# Patient Record
Sex: FEMALE | Race: WHITE | NOT HISPANIC OR LATINO
[De-identification: names, ages, dates, MRNs, and addresses within clinical notes are randomized per-mention and may not be internally consistent; named-entity substitution may affect disease eponyms.]

---
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
## 2023-01-11 ENCOUNTER — APPOINTMENT (OUTPATIENT)
Dept: PEDIATRICS | Facility: CLINIC | Age: 18
End: 2023-01-11
Payer: COMMERCIAL

## 2023-01-11 VITALS — TEMPERATURE: 97.8 F | WEIGHT: 156.5 LBS

## 2023-01-11 DIAGNOSIS — R09.81 NASAL CONGESTION: ICD-10-CM

## 2023-01-11 DIAGNOSIS — R05.9 COUGH, UNSPECIFIED: ICD-10-CM

## 2023-01-11 DIAGNOSIS — Z87.39 PERSONAL HISTORY OF OTHER DISEASES OF THE MUSCULOSKELETAL SYSTEM AND CONNECTIVE TISSUE: ICD-10-CM

## 2023-01-11 DIAGNOSIS — B27.80 OTHER INFECTIOUS MONONUCLEOSIS W/OUT COMPLICATION: ICD-10-CM

## 2023-01-11 PROCEDURE — 99213 OFFICE O/P EST LOW 20 MIN: CPT

## 2023-01-11 NOTE — DISCUSSION/SUMMARY
[FreeTextEntry1] : We discussed symptomatic treatment of cough and nasal sx, saline nose drops and humidifier, increased fluids and rest. Advil Sinus prn. Referral to ENT and Pulmonary. We discussed having a CXR if she is feeling worse before she leaves. I do not think restarting antibiotic for the third time is the right thing to do.\par

## 2023-01-11 NOTE — HISTORY OF PRESENT ILLNESS
[de-identified] : congestion and cough [FreeTextEntry6] : Mary is here with history of runny nose and congestion for "months", she was seen at college at an urgent care in November treated with Amox and Steroid without much relief, seen again (same urgent care)\par December repeated Amox and Steroid added cough med, COVID and FLU NEG, never fever (she did develop a yeast infection).\par She is leaving for college in 5 days and came to be checked again for persistent congestion and stuffiness, runny nose, no fever, intermittent headache, sleeping and eating normally. No relief with OTC. No PMHX asthma or allergy. Mom made an apt with ENT but wasn't able to get in before she leaves (made apt for March)\par She ALSO has bumps around her areola that she saw DERM for [place], culture taken, treated topically with antibiotic, better but occasionally gets red and drains yellow. Does not have a GYN yet.

## 2023-01-11 NOTE — REVIEW OF SYSTEMS
[Fever] : no fever [Headache] : headache [Nasal Discharge] : nasal discharge [Nasal Congestion] : nasal congestion [Cough] : cough

## 2023-01-11 NOTE — PHYSICAL EXAM
[Tenderness] : no tenderness [Mucoid Discharge] : mucoid discharge [Soft] : soft [Tender] : nontender [NL] : no abnormal lymph nodes palpated [FreeTextEntry1] : 97.8 [FreeTextEntry2] : no sinus tenderness

## 2023-03-06 ENCOUNTER — NON-APPOINTMENT (OUTPATIENT)
Age: 18
End: 2023-03-06

## 2023-03-06 ENCOUNTER — APPOINTMENT (OUTPATIENT)
Dept: OTOLARYNGOLOGY | Facility: CLINIC | Age: 18
End: 2023-03-06
Payer: COMMERCIAL

## 2023-03-06 VITALS
HEART RATE: 65 BPM | SYSTOLIC BLOOD PRESSURE: 107 MMHG | DIASTOLIC BLOOD PRESSURE: 64 MMHG | HEIGHT: 69 IN | TEMPERATURE: 98.2 F | BODY MASS INDEX: 22.22 KG/M2 | WEIGHT: 150 LBS

## 2023-03-06 PROCEDURE — 99204 OFFICE O/P NEW MOD 45 MIN: CPT | Mod: 25

## 2023-03-06 PROCEDURE — 31231 NASAL ENDOSCOPY DX: CPT

## 2023-03-06 NOTE — PROCEDURE
[FreeTextEntry6] : Procedure performed: Nasal Endoscopy- Diagnostic Pre-op indication(s): nasal congestion Post-op indication(s): nasal congestion  Verbal and/or written consent obtained from patient Anterior rhinoscopy insufficient to account for symptoms Scope #: 3,  flexible fiber optic telescope  The scope was introduced in the nasal passage between the middle and inferior turbinates to exam the inferior portion of the middle meatus and the fontanelle, as well as the maxillary ostia.  Next, the scope was passed medically and posteriorly to the middle turbinates to examine the sphenoethmoid recess and the superior turbinate region. Upon visualization the finders are as follows: \par Nasal Septum: R DNS\par Bilateral - Mucosa: boggy turbinates, Mucous: scant, Polyp: not seen, Inferior Turbinate: boggy, Middle Turbinate: normal, Superior Turbinate: normal, Inferior Meatus: narrow, Middle Meatus: narrow, Super Meatus:normal, Sphenoethmoidal Recess: clear\par diffuse swelling

## 2023-03-06 NOTE — PHYSICAL EXAM
[Nasal Endoscopy Performed] : nasal endoscopy was performed, see procedure section for findings [] : septum deviated to the right [Normal] : no abnormal secretions [de-identified] : neck tightness

## 2023-03-06 NOTE — REVIEW OF SYSTEMS
[Post Nasal Drip] : post nasal drip [As Noted in HPI] : as noted in HPI [Nasal Congestion] : nasal congestion [Sinus Pain] : sinus pain [Sinus Pressure] : sinus pressure [Negative] : Heme/Lymph

## 2023-03-06 NOTE — CONSULT LETTER
[Please see my note below.] : Please see my note below. [FreeTextEntry1] : Dear Dr. KELLY OVERTON  I had the pleasure of evaluating your patient THEO BAUM, thank you for allowing us to participate in their care. please see full note detailing our visit below. If you have any questions, please do not hesitate to call me and I would be happy to discuss further.   David Pagan M.D. Attending Physician,   Department of Otolaryngology - Head and Neck Surgery [facility]  Office: [phone] Fax: [phone]\par

## 2023-03-06 NOTE — HISTORY OF PRESENT ILLNESS
[de-identified] : 17 year old female presents with persistent congestion, ear pressure, headache and sinus pressure for at least 3 years. Sinus pressure is at frontal, under eyes, and sometimes at ears. Headache is diffuse. Stuffiness is worse on the right side. Patient had allergy testing in the past that indicated allergies to dust, dogs and cats but still feels poorly when at college away from animals. Has tried antibiotics on two occasions, flonase, nasal sprays, singulair which does not provide durable relief. Denies taking steroids recently. Denies hx of ear infections or sinus infections.

## 2023-03-06 NOTE — ASSESSMENT
[FreeTextEntry1] : 16 y/o F pt presents with chronic sinusitis. On exam right DNS, and diffuse swelling.\par -We will proceed with a regiment of decongestants, antibiotics and irrigation to try to break the cycle of inflation and obstruction. this will treat the acute symptoms and will hopefully allow for maintenance therapy to reach disease areas and avoid further intervention.\par - discussed starting Flonase and nasal washes \par - if condition does not improve, discussed potential for surgical or procedure intervention\par - can consider CT scan to further assess sinuses if medical tx does not provide relief \par \par Pt also reports ear discomfort. On exam pt presents with tight neck and overall benign ear exam\par - ear aches could be due to neck tightness. Acupuncture or massage therapy discussed to see if this helps with ear

## 2023-05-18 ENCOUNTER — APPOINTMENT (OUTPATIENT)
Dept: OTOLARYNGOLOGY | Facility: CLINIC | Age: 18
End: 2023-05-18
Payer: COMMERCIAL

## 2023-05-18 VITALS
HEART RATE: 65 BPM | BODY MASS INDEX: 22.22 KG/M2 | HEIGHT: 69 IN | TEMPERATURE: 98.3 F | DIASTOLIC BLOOD PRESSURE: 61 MMHG | SYSTOLIC BLOOD PRESSURE: 114 MMHG | WEIGHT: 150 LBS

## 2023-05-18 PROCEDURE — 99214 OFFICE O/P EST MOD 30 MIN: CPT | Mod: 25

## 2023-05-18 PROCEDURE — 31231 NASAL ENDOSCOPY DX: CPT

## 2023-05-18 NOTE — CONSULT LETTER
[Please see my note below.] : Please see my note below. [FreeTextEntry1] : Dear Dr. KELLY OVERTON  I had the pleasure of evaluating your patient THEO BAUM, thank you for allowing us to participate in their care. please see full note detailing our visit below. If you have any questions, please do not hesitate to call me and I would be happy to discuss further.   David Pagan M.D. Attending Physician,   Department of Otolaryngology - Head and Neck Surgery [facility]  Office: [phone] Fax: [phone]\par

## 2023-05-18 NOTE — END OF VISIT
[FreeTextEntry3] : I personally saw and examined the patient in detail. I spoke to LEEANNE Booth regarding the assessment and plan of care.  I preformed the procedures and I reviewed the above assessment and plan of care, and agree. I have made changes in changes in the body of the note where appropriate.

## 2023-05-18 NOTE — HISTORY OF PRESENT ILLNESS
[de-identified] : 17 year old female presents with persistent congestion, ear pressure, headache and sinus pressure for at least 3 years. Sinus pressure is at frontal, under eyes, and sometimes at ears. Headache is diffuse. Stuffiness is worse on the right side. Patient had allergy testing in the past that indicated allergies to dust, dogs and cats but still feels poorly when at college away from animals. Has tried antibiotics on two occasions, flonase, nasal sprays, singulair which does not provide durable relief. Denies taking steroids recently. Denies hx of ear infections or sinus infections.   [FreeTextEntry1] : Patient following up with persistent congestion and sinus pressure at forehead and under eyes for at least 3 years. States while taking sinusitis regiment, was less stuffy but stuffiness came back after completion. Denies sinus infections. Tried flonase which felt like it wasn’t getting where it needed to go, so stopped doing them. \par \par States ear discomfort resolved since last visit.

## 2023-05-18 NOTE — PROCEDURE
[FreeTextEntry6] : Procedure performed: Nasal Endoscopy- Diagnostic Pre-op indication(s): nasal congestion Post-op indication(s): nasal congestion  Verbal and/or written consent obtained from patient Anterior rhinoscopy insufficient to account for symptoms Scope #: 3,  flexible fiber optic telescope  The scope was introduced in the nasal passage between the middle and inferior turbinates to exam the inferior portion of the middle meatus and the fontanelle, as well as the maxillary ostia.  Next, the scope was passed medically and posteriorly to the middle turbinates to examine the sphenoethmoid recess and the superior turbinate region. Upon visualization the finders are as follows: \par Nasal Septum: R DNS, septum comes caudally to left\par Bilateral - Mucosa: boggy turbinates, Mucous: scant, Polyp: not seen, Inferior Turbinate: boggy, Middle Turbinate: normal, Superior Turbinate: normal, Inferior Meatus: narrow, Middle Meatus: narrow, Super Meatus:normal, Sphenoethmoidal Recess: clear\par diffuse swelling R>L

## 2023-05-18 NOTE — PHYSICAL EXAM
[Nasal Endoscopy Performed] : nasal endoscopy was performed, see procedure section for findings [] : septum deviated to the right [Normal] : no abnormal secretions [de-identified] : + jorge

## 2023-05-18 NOTE — ASSESSMENT
[FreeTextEntry1] : 16 y/o F pt presents with chronic sinusitis. On exam right DNS, septum comes caudally to left and diffuse swelling R>L. Antibiotics and steroids did not provide durable relief. \par - discussed starting Flonase and nasal washes \par - discussed ordering CT scan to further evaluate sinuses. \par - discussed office vs sleeping sinus procedure depending on CT scan results. \par -Risks benefits and alternatives of endoscopic sinus surgery with possible image guidance possible septoplasty bilateral inferior turbinate reduction discussed with patient at length. Risks discussed include but were not limited to bleeding, infection, persistent symptoms, scarring, injury to the skull base and brain and CSF leak, injury to orbit, crusting, septal hematoma, septal perforation results in whistling, crusting and bleeding as well as continued nasal obstruction etc. were discussed. also discussed option of office balloon - similar risk profile, will not address septum and turbs and is less invasive with quicker recover however also higher possibility for need for future intervention. They would like to proceed with balloon.\par -Risks benefits and alternatives of endoscopic sinus surgery with possible image guidance possible septoplasty bilateral inferior turbinate reduction discussed with patient at length. Risks discussed include but were not limited to bleeding, infection, persistent symptoms, scarring, injury to the skull base and brain and CSF leak, injury to orbit, crusting, septal hematoma, septal perforation results in whistling, crusting and bleeding as well as continued nasal obstruction etc. were discussed. Patient verbalized understanding of risks and benefits and also asked probing follow up questions which were answered to clarify details and get full understanding.\par - will determine whether to do sleeping vs office sinus procedure based on CT scan results. Possible right  if do sleeping. \par \par \par

## 2023-05-31 ENCOUNTER — APPOINTMENT (OUTPATIENT)
Dept: CT IMAGING | Facility: CLINIC | Age: 18
End: 2023-05-31
Payer: COMMERCIAL

## 2023-05-31 PROCEDURE — 70486 CT MAXILLOFACIAL W/O DYE: CPT

## 2023-06-05 ENCOUNTER — NON-APPOINTMENT (OUTPATIENT)
Age: 18
End: 2023-06-05

## 2023-06-29 ENCOUNTER — APPOINTMENT (OUTPATIENT)
Dept: OTOLARYNGOLOGY | Facility: CLINIC | Age: 18
End: 2023-06-29
Payer: COMMERCIAL

## 2023-06-29 VITALS
DIASTOLIC BLOOD PRESSURE: 58 MMHG | HEART RATE: 67 BPM | WEIGHT: 150 LBS | TEMPERATURE: 97.9 F | BODY MASS INDEX: 22.22 KG/M2 | HEIGHT: 69 IN | SYSTOLIC BLOOD PRESSURE: 108 MMHG

## 2023-06-29 PROCEDURE — 99214 OFFICE O/P EST MOD 30 MIN: CPT | Mod: 25

## 2023-06-29 PROCEDURE — 31231 NASAL ENDOSCOPY DX: CPT

## 2023-06-29 NOTE — HISTORY OF PRESENT ILLNESS
[de-identified] : 17 year old female presents with persistent congestion, ear pressure, headache and sinus pressure for at least 3 years. Sinus pressure is at frontal, under eyes, and sometimes at ears. Headache is diffuse. Stuffiness is worse on the right side. Patient had allergy testing in the past that indicated allergies to dust, dogs and cats but still feels poorly when at college away from animals. Has tried antibiotics on two occasions, flonase, nasal sprays, singulair which does not provide durable relief. Denies taking steroids recently. Denies hx of ear infections or sinus infections.  \par \par Patient following up with persistent congestion and sinus pressure at forehead and under eyes for at least 3 years. States while taking sinusitis regiment, was less stuffy but stuffiness came back after completion. Denies sinus infections. Tried flonase which felt like it wasn’t getting where it needed to go, so stopped doing them. \par \par States ear discomfort resolved since last visit.  [FreeTextEntry1] : Patient following up still with nasal congestion. Here to review CT scan and come up with plan. Sinusitis regiment helped temporarily but symptoms came back. Denies using nasal sprays or saline washes.

## 2023-06-29 NOTE — CONSULT LETTER
[Please see my note below.] : Please see my note below. [FreeTextEntry1] : Dear Dr. KELLY OVERTON  I had the pleasure of evaluating your patient THEO BAUM, thank you for allowing us to participate in their care. please see full note detailing our visit below. If you have any questions, please do not hesitate to call me and I would be happy to discuss further.   David Pagan M.D. Attending Physician,   Department of Otolaryngology - Head and Neck Surgery [facility]  Office: [phone] Fax: [phone]\par

## 2023-06-29 NOTE — PHYSICAL EXAM
[Nasal Endoscopy Performed] : nasal endoscopy was performed, see procedure section for findings [] : septum deviated to the right [Normal] : no abnormal secretions [de-identified] : + jorge

## 2023-06-29 NOTE — ASSESSMENT
[FreeTextEntry1] : 18 y/o F pt presents with chronic sinusitis. On exam right DNS, septum comes caudally to left and diffuse swelling R>L. Antibiotics and steroids did not provide durable relief. \par - discussed starting Flonase and nasal washes \par - CT scan indicated: Mucosal thickening and aerosolized secretions within the posterior aspect of a single sphenoid sinus. Mucosal thickening obstructing the ostium of the right ostiomeatal unit. Nasal septal deviation to the right markedly narrowing the right nasal cavity\par -Risks benefits and alternatives of endoscopic sinus surgery with possible image guidance possible septoplasty bilateral inferior turbinate reduction discussed with patient at length. Risks discussed include but were not limited to bleeding, infection, persistent symptoms, scarring, injury to the skull base and brain and CSF leak, injury to orbit, crusting, septal hematoma, septal perforation results in whistling, crusting and bleeding as well as continued nasal obstruction etc. were discussed. also discussed option of office balloon - similar risk profile, will not address septum and turbs and is less invasive with quicker recover however also higher possibility for need for future intervention. \par -Risks benefits and alternatives of endoscopic sinus surgery with possible image guidance possible septoplasty bilateral inferior turbinate reduction discussed with patient at length. Risks discussed include but were not limited to bleeding, infection, persistent symptoms, scarring, injury to the skull base and brain and CSF leak, injury to orbit, crusting, septal hematoma, septal perforation results in whistling, crusting and bleeding as well as continued nasal obstruction etc. were discussed. Patient verbalized understanding of risks and benefits and also asked probing follow up questions which were answered to clarify details and get full understanding.\par - discussed cosmetic concerns and advised patient subtle changes are difficult and may not meet her goals. Can do right  to help fill in that side and create symmetry. \par - will determine whether to do sleeping vs office sinus procedure based on CT scan results. Right  if do sleeping. will book patient for septum, turbinates, fess possible frontals as most of her symptoms are at forehead but scan just with [facility] obstruction, discussed risk of frontal sinusotomy and risk of persistent forehead pressure . \par - photos today

## 2023-11-06 ENCOUNTER — OUTPATIENT (OUTPATIENT)
Dept: OUTPATIENT SERVICES | Facility: HOSPITAL | Age: 18
LOS: 1 days | End: 2023-11-06
Payer: COMMERCIAL

## 2023-11-06 VITALS
DIASTOLIC BLOOD PRESSURE: 64 MMHG | SYSTOLIC BLOOD PRESSURE: 112 MMHG | WEIGHT: 158.95 LBS | TEMPERATURE: 98 F | OXYGEN SATURATION: 98 % | HEART RATE: 56 BPM | RESPIRATION RATE: 16 BRPM | HEIGHT: 69 IN

## 2023-11-06 DIAGNOSIS — J32.4 CHRONIC PANSINUSITIS: ICD-10-CM

## 2023-11-06 DIAGNOSIS — Z98.890 OTHER SPECIFIED POSTPROCEDURAL STATES: Chronic | ICD-10-CM

## 2023-11-06 DIAGNOSIS — J34.3 HYPERTROPHY OF NASAL TURBINATES: ICD-10-CM

## 2023-11-06 DIAGNOSIS — J34.2 DEVIATED NASAL SEPTUM: ICD-10-CM

## 2023-11-06 DIAGNOSIS — Z01.818 ENCOUNTER FOR OTHER PREPROCEDURAL EXAMINATION: ICD-10-CM

## 2023-11-06 LAB
ANION GAP SERPL CALC-SCNC: 12 MMOL/L — SIGNIFICANT CHANGE UP (ref 5–17)
ANION GAP SERPL CALC-SCNC: 12 MMOL/L — SIGNIFICANT CHANGE UP (ref 5–17)
BUN SERPL-MCNC: 14 MG/DL — SIGNIFICANT CHANGE UP (ref 7–23)
BUN SERPL-MCNC: 14 MG/DL — SIGNIFICANT CHANGE UP (ref 7–23)
CALCIUM SERPL-MCNC: 9.2 MG/DL — SIGNIFICANT CHANGE UP (ref 8.4–10.5)
CALCIUM SERPL-MCNC: 9.2 MG/DL — SIGNIFICANT CHANGE UP (ref 8.4–10.5)
CHLORIDE SERPL-SCNC: 103 MMOL/L — SIGNIFICANT CHANGE UP (ref 96–108)
CHLORIDE SERPL-SCNC: 103 MMOL/L — SIGNIFICANT CHANGE UP (ref 96–108)
CO2 SERPL-SCNC: 22 MMOL/L — SIGNIFICANT CHANGE UP (ref 22–31)
CO2 SERPL-SCNC: 22 MMOL/L — SIGNIFICANT CHANGE UP (ref 22–31)
CREAT SERPL-MCNC: 0.68 MG/DL — SIGNIFICANT CHANGE UP (ref 0.5–1.3)
CREAT SERPL-MCNC: 0.68 MG/DL — SIGNIFICANT CHANGE UP (ref 0.5–1.3)
EGFR: 128 ML/MIN/1.73M2 — SIGNIFICANT CHANGE UP
EGFR: 128 ML/MIN/1.73M2 — SIGNIFICANT CHANGE UP
GLUCOSE SERPL-MCNC: 81 MG/DL — SIGNIFICANT CHANGE UP (ref 70–99)
GLUCOSE SERPL-MCNC: 81 MG/DL — SIGNIFICANT CHANGE UP (ref 70–99)
HCT VFR BLD CALC: 38.4 % — SIGNIFICANT CHANGE UP (ref 34.5–45)
HCT VFR BLD CALC: 38.4 % — SIGNIFICANT CHANGE UP (ref 34.5–45)
HGB BLD-MCNC: 12.6 G/DL — SIGNIFICANT CHANGE UP (ref 11.5–15.5)
HGB BLD-MCNC: 12.6 G/DL — SIGNIFICANT CHANGE UP (ref 11.5–15.5)
MCHC RBC-ENTMCNC: 29.7 PG — SIGNIFICANT CHANGE UP (ref 27–34)
MCHC RBC-ENTMCNC: 29.7 PG — SIGNIFICANT CHANGE UP (ref 27–34)
MCHC RBC-ENTMCNC: 32.8 GM/DL — SIGNIFICANT CHANGE UP (ref 32–36)
MCHC RBC-ENTMCNC: 32.8 GM/DL — SIGNIFICANT CHANGE UP (ref 32–36)
MCV RBC AUTO: 90.6 FL — SIGNIFICANT CHANGE UP (ref 80–100)
MCV RBC AUTO: 90.6 FL — SIGNIFICANT CHANGE UP (ref 80–100)
NRBC # BLD: 0 /100 WBCS — SIGNIFICANT CHANGE UP (ref 0–0)
NRBC # BLD: 0 /100 WBCS — SIGNIFICANT CHANGE UP (ref 0–0)
PLATELET # BLD AUTO: 361 K/UL — SIGNIFICANT CHANGE UP (ref 150–400)
PLATELET # BLD AUTO: 361 K/UL — SIGNIFICANT CHANGE UP (ref 150–400)
POTASSIUM SERPL-MCNC: 4 MMOL/L — SIGNIFICANT CHANGE UP (ref 3.5–5.3)
POTASSIUM SERPL-MCNC: 4 MMOL/L — SIGNIFICANT CHANGE UP (ref 3.5–5.3)
POTASSIUM SERPL-SCNC: 4 MMOL/L — SIGNIFICANT CHANGE UP (ref 3.5–5.3)
POTASSIUM SERPL-SCNC: 4 MMOL/L — SIGNIFICANT CHANGE UP (ref 3.5–5.3)
RBC # BLD: 4.24 M/UL — SIGNIFICANT CHANGE UP (ref 3.8–5.2)
RBC # BLD: 4.24 M/UL — SIGNIFICANT CHANGE UP (ref 3.8–5.2)
RBC # FLD: 14.2 % — SIGNIFICANT CHANGE UP (ref 10.3–14.5)
RBC # FLD: 14.2 % — SIGNIFICANT CHANGE UP (ref 10.3–14.5)
SODIUM SERPL-SCNC: 137 MMOL/L — SIGNIFICANT CHANGE UP (ref 135–145)
SODIUM SERPL-SCNC: 137 MMOL/L — SIGNIFICANT CHANGE UP (ref 135–145)
WBC # BLD: 4.94 K/UL — SIGNIFICANT CHANGE UP (ref 3.8–10.5)
WBC # BLD: 4.94 K/UL — SIGNIFICANT CHANGE UP (ref 3.8–10.5)
WBC # FLD AUTO: 4.94 K/UL — SIGNIFICANT CHANGE UP (ref 3.8–10.5)
WBC # FLD AUTO: 4.94 K/UL — SIGNIFICANT CHANGE UP (ref 3.8–10.5)

## 2023-11-06 PROCEDURE — 80048 BASIC METABOLIC PNL TOTAL CA: CPT

## 2023-11-06 PROCEDURE — 85027 COMPLETE CBC AUTOMATED: CPT

## 2023-11-06 PROCEDURE — G0463: CPT

## 2023-11-06 RX ORDER — SODIUM CHLORIDE 9 MG/ML
3 INJECTION INTRAMUSCULAR; INTRAVENOUS; SUBCUTANEOUS EVERY 8 HOURS
Refills: 0 | Status: DISCONTINUED | OUTPATIENT
Start: 2023-11-22 | End: 2023-12-06

## 2023-11-06 RX ORDER — LIDOCAINE HCL 20 MG/ML
0.2 VIAL (ML) INJECTION ONCE
Refills: 0 | Status: DISCONTINUED | OUTPATIENT
Start: 2023-11-22 | End: 2023-12-06

## 2023-11-06 RX ORDER — SODIUM CHLORIDE 9 MG/ML
1000 INJECTION, SOLUTION INTRAVENOUS
Refills: 0 | Status: DISCONTINUED | OUTPATIENT
Start: 2023-11-22 | End: 2023-12-06

## 2023-11-06 NOTE — H&P PST ADULT - PROBLEM SELECTOR PLAN 1
Septoplasty Jordi Inferior Turbinoplasty Functional Endoscopic Sinus Surgery with [facility] on 11/22/23  Pre-op education provided - all questions answered. Pt verbalized understanding

## 2023-11-06 NOTE — H&P PST ADULT - NSICDXPASTMEDICALHX_GEN_ALL_CORE_FT
PAST MEDICAL HISTORY:  2019 novel coronavirus disease (COVID-19)     Asthma with hay fever     Chronic pansinusitis     Deviated nasal septum     H/O hemorrhoids     Hypertrophy of both inferior nasal turbinates     Mild asthma without complication

## 2023-11-06 NOTE — H&P PST ADULT - HISTORY OF PRESENT ILLNESS
19 y/o F with h/o Asthma (mild), presents with persistent congestion, ear pressure, headache and sinus pressure for at least 3 years. Pt states she has tried Flonase nasal sprays and singular which does not provide durable relief. Today she presents to PST for scheduled Septoplasty Jordi Inferior Turbinoplasty Functional Endoscopic Sinus Surgery with [facility] on 11/22/23. Denies any palpitations, SOB, N/V, fever or chills.

## 2023-11-08 PROBLEM — U07.1 COVID-19: Chronic | Status: ACTIVE | Noted: 2023-11-06

## 2023-11-08 PROBLEM — J45.909 UNSPECIFIED ASTHMA, UNCOMPLICATED: Chronic | Status: ACTIVE | Noted: 2023-11-06

## 2023-11-08 PROBLEM — J34.2 DEVIATED NASAL SEPTUM: Chronic | Status: ACTIVE | Noted: 2023-11-06

## 2023-11-08 PROBLEM — J32.4 CHRONIC PANSINUSITIS: Chronic | Status: ACTIVE | Noted: 2023-11-06

## 2023-11-08 PROBLEM — J34.3 HYPERTROPHY OF NASAL TURBINATES: Chronic | Status: ACTIVE | Noted: 2023-11-06

## 2023-11-08 PROBLEM — Z87.19 PERSONAL HISTORY OF OTHER DISEASES OF THE DIGESTIVE SYSTEM: Chronic | Status: ACTIVE | Noted: 2023-11-06

## 2023-11-14 ENCOUNTER — NON-APPOINTMENT (OUTPATIENT)
Age: 18
End: 2023-11-14

## 2023-11-16 ENCOUNTER — APPOINTMENT (OUTPATIENT)
Dept: OTOLARYNGOLOGY | Facility: CLINIC | Age: 18
End: 2023-11-16
Payer: COMMERCIAL

## 2023-11-16 VITALS
WEIGHT: 150 LBS | HEART RATE: 62 BPM | DIASTOLIC BLOOD PRESSURE: 58 MMHG | SYSTOLIC BLOOD PRESSURE: 120 MMHG | TEMPERATURE: 97.88 F | HEIGHT: 69 IN | BODY MASS INDEX: 22.22 KG/M2

## 2023-11-16 PROCEDURE — 99214 OFFICE O/P EST MOD 30 MIN: CPT | Mod: 25

## 2023-11-16 PROCEDURE — 31231 NASAL ENDOSCOPY DX: CPT

## 2023-11-21 ENCOUNTER — TRANSCRIPTION ENCOUNTER (OUTPATIENT)
Age: 18
End: 2023-11-21

## 2023-11-22 ENCOUNTER — OUTPATIENT (OUTPATIENT)
Dept: OUTPATIENT SERVICES | Facility: HOSPITAL | Age: 18
LOS: 1 days | End: 2023-11-22
Payer: COMMERCIAL

## 2023-11-22 ENCOUNTER — TRANSCRIPTION ENCOUNTER (OUTPATIENT)
Age: 18
End: 2023-11-22

## 2023-11-22 ENCOUNTER — APPOINTMENT (OUTPATIENT)
Dept: OTOLARYNGOLOGY | Facility: HOSPITAL | Age: 18
End: 2023-11-22

## 2023-11-22 ENCOUNTER — RESULT REVIEW (OUTPATIENT)
Age: 18
End: 2023-11-22

## 2023-11-22 VITALS
WEIGHT: 158.95 LBS | HEART RATE: 72 BPM | OXYGEN SATURATION: 99 % | HEIGHT: 69 IN | DIASTOLIC BLOOD PRESSURE: 75 MMHG | RESPIRATION RATE: 16 BRPM | SYSTOLIC BLOOD PRESSURE: 120 MMHG | TEMPERATURE: 97 F

## 2023-11-22 VITALS
DIASTOLIC BLOOD PRESSURE: 63 MMHG | RESPIRATION RATE: 16 BRPM | OXYGEN SATURATION: 98 % | SYSTOLIC BLOOD PRESSURE: 120 MMHG | HEART RATE: 75 BPM

## 2023-11-22 DIAGNOSIS — J34.2 DEVIATED NASAL SEPTUM: ICD-10-CM

## 2023-11-22 DIAGNOSIS — J34.3 HYPERTROPHY OF NASAL TURBINATES: ICD-10-CM

## 2023-11-22 DIAGNOSIS — J32.4 CHRONIC PANSINUSITIS: ICD-10-CM

## 2023-11-22 DIAGNOSIS — Z98.890 OTHER SPECIFIED POSTPROCEDURAL STATES: Chronic | ICD-10-CM

## 2023-11-22 DIAGNOSIS — Z01.818 ENCOUNTER FOR OTHER PREPROCEDURAL EXAMINATION: ICD-10-CM

## 2023-11-22 PROCEDURE — 88311 DECALCIFY TISSUE: CPT | Mod: 26

## 2023-11-22 PROCEDURE — 30930 THER FX NASAL INF TURBINATE: CPT

## 2023-11-22 PROCEDURE — 61782 SCAN PROC CRANIAL EXTRA: CPT

## 2023-11-22 PROCEDURE — 30465 REPAIR NASAL STENOSIS: CPT

## 2023-11-22 PROCEDURE — 31240 NSL/SNS NDSC CNCH BULL RESCJ: CPT | Mod: LT

## 2023-11-22 PROCEDURE — 31276 NSL/SINS NDSC FRNT TISS RMVL: CPT | Mod: 50

## 2023-11-22 PROCEDURE — 88311 DECALCIFY TISSUE: CPT

## 2023-11-22 PROCEDURE — 31288 NASAL/SINUS ENDOSCOPY SURG: CPT | Mod: LT

## 2023-11-22 PROCEDURE — 31288 NASAL/SINUS ENDOSCOPY SURG: CPT | Mod: 50

## 2023-11-22 PROCEDURE — 30520 REPAIR OF NASAL SEPTUM: CPT

## 2023-11-22 PROCEDURE — 30140 RESECT INFERIOR TURBINATE: CPT | Mod: 50

## 2023-11-22 PROCEDURE — 88304 TISSUE EXAM BY PATHOLOGIST: CPT

## 2023-11-22 PROCEDURE — 20912 REMOVE CARTILAGE FOR GRAFT: CPT | Mod: 59

## 2023-11-22 PROCEDURE — 88304 TISSUE EXAM BY PATHOLOGIST: CPT | Mod: 26

## 2023-11-22 PROCEDURE — 88305 TISSUE EXAM BY PATHOLOGIST: CPT | Mod: 26

## 2023-11-22 PROCEDURE — 31267 ENDOSCOPY MAXILLARY SINUS: CPT | Mod: 50

## 2023-11-22 PROCEDURE — 88305 TISSUE EXAM BY PATHOLOGIST: CPT

## 2023-11-22 PROCEDURE — C9399: CPT

## 2023-11-22 RX ORDER — DIPHENHYDRAMINE HCL 50 MG
12.5 CAPSULE ORAL ONCE
Refills: 0 | Status: DISCONTINUED | OUTPATIENT
Start: 2023-11-22 | End: 2023-12-06

## 2023-11-22 RX ORDER — HYDROMORPHONE HYDROCHLORIDE 2 MG/ML
0.25 INJECTION INTRAMUSCULAR; INTRAVENOUS; SUBCUTANEOUS
Refills: 0 | Status: DISCONTINUED | OUTPATIENT
Start: 2023-11-22 | End: 2023-11-22

## 2023-11-22 RX ORDER — OXYCODONE HYDROCHLORIDE 5 MG/1
5 TABLET ORAL ONCE
Refills: 0 | Status: DISCONTINUED | OUTPATIENT
Start: 2023-11-22 | End: 2023-11-22

## 2023-11-22 RX ORDER — APREPITANT 80 MG/1
40 CAPSULE ORAL ONCE
Refills: 0 | Status: COMPLETED | OUTPATIENT
Start: 2023-11-22 | End: 2023-11-22

## 2023-11-22 RX ORDER — ONDANSETRON 8 MG/1
4 TABLET, FILM COATED ORAL ONCE
Refills: 0 | Status: DISCONTINUED | OUTPATIENT
Start: 2023-11-22 | End: 2023-12-06

## 2023-11-22 RX ORDER — ALBUTEROL 90 UG/1
2 AEROSOL, METERED ORAL
Refills: 0 | DISCHARGE

## 2023-11-22 RX ADMIN — SODIUM CHLORIDE 100 MILLILITER(S): 9 INJECTION, SOLUTION INTRAVENOUS at 07:39

## 2023-11-22 RX ADMIN — APREPITANT 40 MILLIGRAM(S): 80 CAPSULE ORAL at 08:56

## 2023-11-22 NOTE — ASU DISCHARGE PLAN (ADULT/PEDIATRIC) - NS MD DC FALL RISK RISK
For information on Fall & Injury Prevention, visit: [email or web address] OR  [email or web address] OR  https://www.cdc.gov/steadi/patient.html

## 2023-11-22 NOTE — ASU DISCHARGE PLAN (ADULT/PEDIATRIC) - ASU DC SPECIAL INSTRUCTIONSFT
Nasal reconstruction instructions:  Complete antibiotics and steroids as directed on prescription  Pain medication as needed - do not drive, make decisions or operate machinery on pain meds. if constipates take stool softener, do not strain.     Avoid activities that may injure your nose: Right after your surgery, your nose is just starting to heal and can be damaged in many ways. Doing the following will help prevent your nose from getting injured:  •Do not allow anyone to accidently bump your nose. This includes children, pets, and your bed partner.  •Do not blow your nose for at least two weeks after surgery  •Do not do any strenuous activities. Do not go swimming for one month after your surgery.  •Do not wear clothing that you have to pull on over your head.  • Please walk around to keep blood circulating and reduce the risk of blood clots   Bathing and washing:   •Avoid touching or getting your nose dressing wet when washing your face.  •Be gentle with brushing your teeth and use a soft toothbrush.  •Have someone else wash your hair during the first week after your surgery.  •Take tub baths only and do not shower. Keep your dressing dry when bathing.    Limit your facial movements as much as possible:   •Avoid facial movements for one week. This includes grinning, laughing, and smiling.  •Avoid eating foods that need to be chewed for a long time.  •Avoid talking for a long time.    Wearing your contact lens or glasses:   •You can wear your contact lenses  •Do not wear your glasses or sunglasses where they rest on the top of your nose. A if needed you can use some tape to hang glasses off your forehead so the do not rest on your nose      •Nasal drainage: You may be sent home with a dressing under your nose. Change it when it gets wet and avoid touching your nose when doing so.  •Nasal rinsing:  Irrigate your nose cavity with saline (salt solution) 3 to 5 times each day. This should be done for at least two months after your surgery.   •Plaster cast and splint care: You may have a plaster cast or a splint on the outside of your nose. If so, it will be left on your nose for one week, will be removed in the office.  •Do not touch or disturb the cast or splint  •Keep the cast dry.    •Preventing swelling: Swelling of your nose and face is common right after your surgery. It can slow healing and increase your pain. The swelling may not go away for weeks or months after your surgery. Doing the following will help reduce your swelling:  ice pack under your eyes every 2 hours for the first 2 days after surgery if bones were broken and you have asplint.  •Avoid bending over.  •Rest and sleep with your head raised above your feet and body.  •Avoid letting your face get too warm. This includes sitting in the sun, and using sun lamps and hair dryers.        CONTACT A CAREGIVER IF:  •You are sick to your stomach or throw up.  •You have a fever or chills.  •You have increased eye discharge or eye redness, itchiness and swelling.  •perstent watery discharge from your nose   •You have questions or concerns about your condition, medicine, or care.    SEEK CARE IMMEDIATELY IF:  •You have trouble breathing all of a sudden.  •Difficulty with vision  •Your bandage becomes rapidly soaked with blood and the bleeding does not stop.  •Your incision is swollen, red, or has pus coming from it.  •Your stitches come apart.

## 2023-11-22 NOTE — ASU DISCHARGE PLAN (ADULT/PEDIATRIC) - NURSING INSTRUCTIONS
DO NOT take any Tylenol (Acetaminophen) or narcotics containing Tylenol until after  6:20pm . You received Tylenol during your operation and it can cause damage to your liver if too much is taken within a 24 hour time period.

## 2023-11-22 NOTE — ASU PATIENT PROFILE, ADULT - FALL HARM RISK - UNIVERSAL INTERVENTIONS
Bed in lowest position, wheels locked, appropriate side rails in place/Call bell, personal items and telephone in reach/Instruct patient to call for assistance before getting out of bed or chair/Non-slip footwear when patient is out of bed/[place] to call system/Physically safe environment - no spills, clutter or unnecessary equipment/Purposeful Proactive Rounding/Room/bathroom lighting operational, light cord in reach

## 2023-11-27 ENCOUNTER — NON-APPOINTMENT (OUTPATIENT)
Age: 18
End: 2023-11-27

## 2023-11-28 ENCOUNTER — NON-APPOINTMENT (OUTPATIENT)
Age: 18
End: 2023-11-28

## 2023-11-28 LAB
SURGICAL PATHOLOGY STUDY: SIGNIFICANT CHANGE UP
SURGICAL PATHOLOGY STUDY: SIGNIFICANT CHANGE UP

## 2023-11-30 ENCOUNTER — APPOINTMENT (OUTPATIENT)
Dept: OTOLARYNGOLOGY | Facility: CLINIC | Age: 18
End: 2023-11-30
Payer: COMMERCIAL

## 2023-11-30 VITALS
SYSTOLIC BLOOD PRESSURE: 129 MMHG | WEIGHT: 150 LBS | BODY MASS INDEX: 22.22 KG/M2 | TEMPERATURE: 98 F | HEIGHT: 69 IN | HEART RATE: 59 BPM | DIASTOLIC BLOOD PRESSURE: 65 MMHG

## 2023-11-30 PROCEDURE — 31237 NSL/SINS NDSC SURG BX POLYPC: CPT | Mod: 50,58

## 2023-11-30 PROCEDURE — 99024 POSTOP FOLLOW-UP VISIT: CPT

## 2023-12-14 ENCOUNTER — APPOINTMENT (OUTPATIENT)
Dept: OTOLARYNGOLOGY | Facility: CLINIC | Age: 18
End: 2023-12-14
Payer: COMMERCIAL

## 2023-12-14 VITALS
DIASTOLIC BLOOD PRESSURE: 68 MMHG | HEIGHT: 69 IN | TEMPERATURE: 98.3 F | HEART RATE: 81 BPM | WEIGHT: 150 LBS | SYSTOLIC BLOOD PRESSURE: 111 MMHG | BODY MASS INDEX: 22.22 KG/M2

## 2023-12-14 PROCEDURE — 31237 NSL/SINS NDSC SURG BX POLYPC: CPT | Mod: 50,58

## 2023-12-14 PROCEDURE — 99024 POSTOP FOLLOW-UP VISIT: CPT

## 2023-12-14 NOTE — ASSESSMENT
[FreeTextEntry1] : 18 year old female s/p septo, turbs, valve, fess 11/22/23. The patient was debrided bilaterally with rigid suction as a result of nasal crusting. breathing much improved after detriment. Patient should continue to avoid nose blowing, heavy lifting or exercising, and should start a regimen of over the counter nasal saline spray for moisturization of the nasal cavities - will follow up in 3 weeks to assure no scarring and keep sinuses open - emphasized nasal irrigation - continue Flonase  happy with appearance and breathing

## 2023-12-14 NOTE — HISTORY OF PRESENT ILLNESS
[de-identified] : 18 year old female s/p septo, turbs, valve, fess 11/22/23. Doing well. some stuffiness. Doing washes 3 times a day.  [FreeTextEntry1] : Patient following up s/p septo, turbs, valve, fess 11/22/23. For last week has had a headache to forehead and under eyes and spitting up blood. States her breathing is good. Not doing washes.  Using flonase everyday.

## 2023-12-14 NOTE — PROCEDURE
[FreeTextEntry3] : procedure - bilateral endoscopic nasal debridement verbal consent obtained Bilateral nasal cavities inspected with #0 ridged sinus endoscope. septum appeared midline and turbinates well reduced. bilateral middle meatuses were explored and suction and agitator were used to open ostiums and open any forming scar bands. all sinuses were explored and open at end of procedure

## 2023-12-14 NOTE — CONSULT LETTER
[Please see my note below.] : Please see my note below. [FreeTextEntry1] : Dear Dr. KELLY OVERTON  I had the pleasure of evaluating your patient THEO BAUM, thank you for allowing us to participate in their care. please see full note detailing our visit below. If you have any questions, please do not hesitate to call me and I would be happy to discuss further.   David Pagan M.D. Attending Physician,   Department of Otolaryngology - Head and Neck Surgery [facility]  Office: [phone] Fax: [phone]

## 2023-12-21 ENCOUNTER — APPOINTMENT (OUTPATIENT)
Dept: OTOLARYNGOLOGY | Facility: CLINIC | Age: 18
End: 2023-12-21

## 2024-01-04 ENCOUNTER — APPOINTMENT (OUTPATIENT)
Dept: OTOLARYNGOLOGY | Facility: CLINIC | Age: 19
End: 2024-01-04
Payer: COMMERCIAL

## 2024-01-04 VITALS — DIASTOLIC BLOOD PRESSURE: 67 MMHG | HEART RATE: 58 BPM | SYSTOLIC BLOOD PRESSURE: 115 MMHG | TEMPERATURE: 98.1 F

## 2024-01-04 PROCEDURE — 31237 NSL/SINS NDSC SURG BX POLYPC: CPT | Mod: 50,78

## 2024-01-04 PROCEDURE — 99024 POSTOP FOLLOW-UP VISIT: CPT

## 2024-01-04 NOTE — PROCEDURE
[FreeTextEntry3] : procedure - bilateral endoscopic nasal debridement verbal consent obtained Bilateral nasal cavities inspected with #0 ridged sinus endoscope. septum appeared midline and turbinates well reduced. bilateral middle meatuses were explored and suction and agitator were used to open ostiums and open any forming scar bands. all sinuses were explored and open at end of procedure polypoid changes

## 2024-01-04 NOTE — ASSESSMENT
[FreeTextEntry1] : 18 year old female s/p septo, turbs, valve, fess 11/22/23. The patient was debrided bilaterally with rigid suction as a result of nasal crusting. breathing much improved after detriment. Patient should continue a regimen of over the counter nasal saline spray for moisturization of the nasal cavities. With post op polypoid changes, overall healing well.  - will follow up in 2 months to assure no scarring and keep sinuses open - nasal irrigation - nasal precautions  - continue Flonase  happy with appearance and breathing

## 2024-01-04 NOTE — REASON FOR VISIT
Right arm; [Post-Operative Visit] : a post-operative visit [FreeTextEntry2] : s/p septo,turbs,fess 11/22/23

## 2024-01-04 NOTE — HISTORY OF PRESENT ILLNESS
[de-identified] : 18 year old female s/p septo, turbs, valve, fess 11/22/23. Doing well. some stuffiness. Doing washes 3 times a day.   Patient following up s/p septo, turbs, valve, fess 11/22/23. For last week has had a headache to forehead and under eyes and spitting up blood. States her breathing is good. Not doing washes.  Using flonase everyday.  [FreeTextEntry1] : Patient following up s/p septo, turbs, valve, fess 11/22/23. Doing really well, breathing well. Using washes once a day, using flonase once a day.

## 2024-01-04 NOTE — CONSULT LETTER
[Please see my note below.] : Please see my note below. [FreeTextEntry1] : Dear Dr. KELLY OVERTON  I had the pleasure of evaluating your patient THEO BAUM, thank you for allowing us to participate in their care. please see full note detailing our visit below. If you have any questions, please do not hesitate to call me and I would be happy to discuss further.   David Pagan M.D. Attending Physician,   Department of Otolaryngology - Head and Neck Surgery [facility]  Office: [phone] Fax: [phone]

## 2024-03-07 ENCOUNTER — APPOINTMENT (OUTPATIENT)
Dept: OTOLARYNGOLOGY | Facility: CLINIC | Age: 19
End: 2024-03-07
Payer: COMMERCIAL

## 2024-03-07 VITALS
WEIGHT: 150 LBS | SYSTOLIC BLOOD PRESSURE: 108 MMHG | TEMPERATURE: 97.9 F | DIASTOLIC BLOOD PRESSURE: 61 MMHG | BODY MASS INDEX: 22.22 KG/M2 | HEART RATE: 57 BPM | HEIGHT: 69 IN

## 2024-03-07 DIAGNOSIS — J32.4 CHRONIC PANSINUSITIS: ICD-10-CM

## 2024-03-07 DIAGNOSIS — J34.2 DEVIATED NASAL SEPTUM: ICD-10-CM

## 2024-03-07 DIAGNOSIS — J30.1 ALLERGIC RHINITIS DUE TO POLLEN: ICD-10-CM

## 2024-03-07 DIAGNOSIS — J34.3 HYPERTROPHY OF NASAL TURBINATES: ICD-10-CM

## 2024-03-07 PROCEDURE — 31231 NASAL ENDOSCOPY DX: CPT

## 2024-03-07 PROCEDURE — 99213 OFFICE O/P EST LOW 20 MIN: CPT | Mod: 25

## 2024-03-07 NOTE — ASSESSMENT
[FreeTextEntry1] : 18 year old female s/p septo, turbs, valve, fess 11/22/23. Post op polypoid changes resolved, overall healing well.  Patient should continue a regimen of over the counter nasal saline spray for moisturization of the nasal cavities.  - nasal irrigation - nasal precautions  - continue Flonase  happy with appearance and function - will follow up as needed

## 2024-03-07 NOTE — PROCEDURE
[FreeTextEntry3] : Procedure performed: Nasal Endoscopy- Diagnostic Pre-op indication(s): nasal congestion Post-op indication(s): nasal congestion  Verbal and/or written consent obtained from patient Anterior rhinoscopy insufficient to account for symptoms Scope #: 3,  flexible fiber optic telescope  The scope was introduced in the nasal passage between the middle and inferior turbinates to exam the inferior portion of the middle meatus and the fontanelle, as well as the maxillary ostia.  Next, the scope was passed medically and posteriorly to the middle turbinates to examine the sphenoethmoid recess and the superior turbinate region. Upon visualization the finders are as follows: Nasal Septum: midline septum Bilateral - Mucosa: reduced turbinates, Mucous: scant, Polyp: not seen, Inferior Turbinate: boggy, Middle Turbinate: normal, Superior Turbinate: normal, Inferior Meatus: narrow, Middle Meatus: wide open - able to see into all sinuses Super Meatus:normal, Sphenoethmoidal Recess: clear

## 2024-03-07 NOTE — HISTORY OF PRESENT ILLNESS
[de-identified] : 18 year old female s/p septo, turbs, valve, fess 11/22/23. Doing well. some stuffiness. Doing washes 3 times a day.   Patient following up s/p septo, turbs, valve, fess 11/22/23. For last week has had a headache to forehead and under eyes and spitting up blood. States her breathing is good. Not doing washes.  Using flonase everyday.  [FreeTextEntry1] : Patient following up s/p septo, turbs, valve, fess 11/22/23. Doing really well, breathing well. Using washes a few times a week, using flonase once a day. Had polypoid changes last time.

## 2024-03-07 NOTE — CONSULT LETTER
[Please see my note below.] : Please see my note below. [FreeTextEntry1] : Dear Dr. KELLY OVERTON  I had the pleasure of evaluating your patient THEO BAUM, thank you for allowing us to participate in their care. please see full note detailing our visit below. If you have any questions, please do not hesitate to call me and I would be happy to discuss further.   David Pagan M.D. Attending Physician,   Department of Otolaryngology - Head and Neck Surgery [facility]  Office: [phone] Fax: [phone]

## 2024-05-14 ENCOUNTER — APPOINTMENT (OUTPATIENT)
Dept: ORTHOPEDIC SURGERY | Facility: CLINIC | Age: 19
End: 2024-05-14
Payer: COMMERCIAL

## 2024-05-14 VITALS — WEIGHT: 155 LBS | BODY MASS INDEX: 22.96 KG/M2 | HEIGHT: 69 IN

## 2024-05-14 DIAGNOSIS — S43.439A SUPERIOR GLENOID LABRUM LESION OF UNSPECIFIED SHOULDER, INITIAL ENCOUNTER: ICD-10-CM

## 2024-05-14 DIAGNOSIS — J45.909 UNSPECIFIED ASTHMA, UNCOMPLICATED: ICD-10-CM

## 2024-05-14 PROCEDURE — 73030 X-RAY EXAM OF SHOULDER: CPT | Mod: LT

## 2024-05-14 PROCEDURE — 99204 OFFICE O/P NEW MOD 45 MIN: CPT

## 2024-05-14 RX ORDER — FLUTICASONE PROPIONATE 50 UG/1
50 SPRAY, METERED NASAL DAILY
Qty: 1 | Refills: 3 | Status: DISCONTINUED | COMMUNITY
Start: 2023-03-06 | End: 2024-05-14

## 2024-05-14 RX ORDER — OXYCODONE AND ACETAMINOPHEN 5; 325 MG/1; MG/1
5-325 TABLET ORAL
Qty: 8 | Refills: 0 | Status: DISCONTINUED | COMMUNITY
Start: 2023-11-14 | End: 2024-05-14

## 2024-05-14 RX ORDER — PREDNISONE 10 MG/1
10 TABLET ORAL
Qty: 27 | Refills: 0 | Status: DISCONTINUED | COMMUNITY
Start: 2023-11-14 | End: 2024-05-14

## 2024-05-14 RX ORDER — FLUTICASONE PROPIONATE 110 UG/1
110 AEROSOL, METERED RESPIRATORY (INHALATION)
Qty: 12 | Refills: 0 | Status: DISCONTINUED | COMMUNITY
Start: 2021-11-02 | End: 2024-05-14

## 2024-05-14 RX ORDER — AMOXICILLIN AND CLAVULANATE POTASSIUM 875; 125 MG/1; MG/1
875-125 TABLET, COATED ORAL TWICE DAILY
Qty: 28 | Refills: 0 | Status: DISCONTINUED | COMMUNITY
Start: 2023-03-06 | End: 2024-05-14

## 2024-05-14 RX ORDER — PREDNISONE 10 MG/1
10 TABLET ORAL
Qty: 27 | Refills: 0 | Status: DISCONTINUED | COMMUNITY
Start: 2023-03-06 | End: 2024-05-14

## 2024-05-14 RX ORDER — AMOXICILLIN AND CLAVULANATE POTASSIUM 875; 125 MG/1; MG/1
875-125 TABLET, COATED ORAL
Qty: 20 | Refills: 0 | Status: DISCONTINUED | COMMUNITY
Start: 2023-11-14 | End: 2024-05-14

## 2024-05-14 RX ORDER — MELOXICAM 15 MG/1
15 TABLET ORAL
Qty: 30 | Refills: 0 | Status: ACTIVE | COMMUNITY
Start: 2024-05-14 | End: 1900-01-01

## 2024-05-14 RX ORDER — LORATADINE 5 MG/5 ML
0.05 SOLUTION, ORAL ORAL
Qty: 1 | Refills: 0 | Status: DISCONTINUED | COMMUNITY
Start: 2023-03-06 | End: 2024-05-14

## 2024-05-16 ENCOUNTER — APPOINTMENT (OUTPATIENT)
Dept: MRI IMAGING | Facility: CLINIC | Age: 19
End: 2024-05-16
Payer: COMMERCIAL

## 2024-05-16 PROCEDURE — 73221 MRI JOINT UPR EXTREM W/O DYE: CPT | Mod: LT

## 2024-05-24 NOTE — PHYSICAL EXAM
[Left] : left shoulder [] : no atrophy [FreeTextEntry9] : Discomfort at the end of FF [FreeTextEntry1] : xray of the L shoulder was negative for fx or subluxation

## 2024-05-24 NOTE — DISCUSSION/SUMMARY
[Medication Risks Reviewed] : Medication risks reviewed [Surgical risks reviewed] : Surgical risks reviewed [de-identified] : xray of the L shoulder was negative for fx   signs and symptoms of labral pathology, discussed injection , surgery, nsaids and other options, discussed risks of all, will obtain mri to rule out surgical tear and start anti inflammatory mediation and therapy in interim Discussed risks of potential surgery. However, due to the risks of the surgery, we will try NSAIDs and therapy. Discussed management of medication. Plan for PT to work on the surrounding musculature of the shoulder to help the stabilize the shoulder    Prescribed patient Meloxicam 15mg daily and discussed risks of side effects, as well as timing/management of medication.  Side effects can include but are not limited to gastrointestinal ulcers and irritation, kidney failure, and bleeding issues. Use as directed and take with food to manage pain, inflammation, and discomfort.   f/u in 2 weeks    I, Jessica Clay, attest that this documentation has been prepared under the direction and in the presence of Provider Dr. Gustavo Street

## 2024-05-24 NOTE — HISTORY OF PRESENT ILLNESS
[de-identified] : Patient is here for left shoulder pain that began in 10/2023, not due to injury. Denies n/t. Pain radiates into shoulder blade/biceps. Popping sensation with lifting arm. Discomfort with sleep. No prior injury. No formal treatment to date. Tried Tylenol.

## 2024-05-28 ENCOUNTER — APPOINTMENT (OUTPATIENT)
Dept: ORTHOPEDIC SURGERY | Facility: CLINIC | Age: 19
End: 2024-05-28
Payer: COMMERCIAL

## 2024-05-28 VITALS — HEIGHT: 69 IN | BODY MASS INDEX: 22.96 KG/M2 | WEIGHT: 155 LBS

## 2024-05-28 DIAGNOSIS — M75.42 IMPINGEMENT SYNDROME OF LEFT SHOULDER: ICD-10-CM

## 2024-05-28 DIAGNOSIS — Z78.9 OTHER SPECIFIED HEALTH STATUS: ICD-10-CM

## 2024-05-28 PROCEDURE — 99214 OFFICE O/P EST MOD 30 MIN: CPT

## 2024-06-04 PROBLEM — M75.42 INTERNAL IMPINGEMENT OF LEFT SHOULDER: Status: ACTIVE | Noted: 2024-06-04

## 2024-06-04 NOTE — DATA REVIEWED
[MRI] : MRI [Left] : left [Shoulder] : shoulder [Report was reviewed and noted in the chart] : The report was reviewed and noted in the chart [I independently reviewed and interpreted images and report] : I independently reviewed and interpreted images and report [I reviewed the films/CD] : I reviewed the films/CD [FreeTextEntry1] : MRI of the L shoulder revealed no rotator cuff tear or osseous injuries

## 2024-06-04 NOTE — HISTORY OF PRESENT ILLNESS
[de-identified] : Here for MRI results of the left shoulder today. Feeling slightly better than the previous visit. Still aching in the area with certain movements. Started the PT @ Prof as well. Mom was called with the preliminary results.

## 2024-06-04 NOTE — DISCUSSION/SUMMARY
[Medication Risks Reviewed] : Medication risks reviewed [Surgical risks reviewed] : Surgical risks reviewed [de-identified] : MRI of the L shoulder revealed no rotator cuff tear or osseous injuries   We reviewed the mri findings and discussed treatment options, both operative and non operative. Discussed risks of potential surgery. However, due to the risks of the surgery, we will try NSAIDs and therapy. Discussed management of medication.  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 Side effects include but are not limited to gi ulcers and irritation, as well as kidney failure and bleeding issues.   f/u in 4 weeks  If pain persist would rec obtaining MR arthrogram to eval for subtly labral tear   I, Jessica Clay, attest that this documentation has been prepared under the direction and in the presence of Provider Dr. Gustavo Street

## 2024-06-25 ENCOUNTER — APPOINTMENT (OUTPATIENT)
Dept: ORTHOPEDIC SURGERY | Facility: CLINIC | Age: 19
End: 2024-06-25

## 2024-06-25 PROCEDURE — 99214 OFFICE O/P EST MOD 30 MIN: CPT

## 2024-08-06 ENCOUNTER — APPOINTMENT (OUTPATIENT)
Dept: ORTHOPEDIC SURGERY | Facility: CLINIC | Age: 19
End: 2024-08-06

## (undated) DEVICE — SUT PLAIN GUT 4-0 18" CT-1

## (undated) DEVICE — DRSG NASOPORE 4CM FIRM

## (undated) DEVICE — SPECIMEN CONTAINER 100ML

## (undated) DEVICE — GLV 6.5 PROTEXIS (WHITE)

## (undated) DEVICE — ELCTR BOVIE TIP NEEDLE 2.84"

## (undated) DEVICE — SUT CHROMIC GUT 4-0 18" P-13

## (undated) DEVICE — DRSG NASOPORE 8CM STANDARD

## (undated) DEVICE — SOL ANTI FOG

## (undated) DEVICE — Device

## (undated) DEVICE — SOL IRR POUR NS 0.9% 500ML

## (undated) DEVICE — ELCTR BOVIE PENCIL HANDPIECE

## (undated) DEVICE — BLADE SCALPEL SAFETYLOCK #11

## (undated) DEVICE — DRAPE LIGHT HANDLE COVER (GREEN)

## (undated) DEVICE — PREP BETADINE SPONGE STICKS

## (undated) DEVICE — PACKING GAUZE PLAIN 0.5"

## (undated) DEVICE — POSITIONER FOAM EGG CRATE ULNAR 2PCS (PINK)

## (undated) DEVICE — SOL IRR POUR H2O 250ML

## (undated) DEVICE — ELCTR BOVIE SUCTION 11FR 8"

## (undated) DEVICE — APPLICATOR Q TIP 6" WOOD STEM

## (undated) DEVICE — GLV 7 PROTEXIS (WHITE)

## (undated) DEVICE — DRAPE TOWEL BLUE 17" X 24"

## (undated) DEVICE — CATH IV SAFE INSYTE 14G X 1.75" (ORANGE)

## (undated) DEVICE — ELCTR BOVIE TIP NEEDLE INSULATED 2.8" EDGE

## (undated) DEVICE — WARMING BLANKET LOWER ADULT

## (undated) DEVICE — NDL HYPO REGULAR BEVEL 25G X 1.5" (BLUE)

## (undated) DEVICE — MARKING PEN W RULER

## (undated) DEVICE — DRAPE INSTRUMENT POUCH 6.75" X 11"

## (undated) DEVICE — GOWN LG

## (undated) DEVICE — BLADE MEDTRONIC ENT SILVER BULLET ROTATABLE STRAIGHT 2.9MM X 11CM

## (undated) DEVICE — NDL HYPO REGULAR BEVEL 22G X 1.5" (TURQUOISE)

## (undated) DEVICE — DRAPE MAGNETIC INSTRUMENT MEDIUM

## (undated) DEVICE — SUT MONOSOF 3-0 18" C-14

## (undated) DEVICE — SYR LUER LOK 10CC

## (undated) DEVICE — BASIN AMBULATORY

## (undated) DEVICE — SUT CHROMIC 3-0 30" C-13

## (undated) DEVICE — GLV 7.5 PROTEXIS (WHITE)

## (undated) DEVICE — SUT MONOSOF 2-0 18" C-15

## (undated) DEVICE — TUBING SUCTION 20FT

## (undated) DEVICE — SUT MONOSOF 3-0 30" C-13

## (undated) DEVICE — MEDTRONIC AXIEM PATIENT TRACKER NON-INVASIVE

## (undated) DEVICE — BLADE SCALPEL SAFETYLOCK #15

## (undated) DEVICE — MEDTRONIC PATIENT TRACKER ENT

## (undated) DEVICE — VENODYNE/SCD SLEEVE CALF MEDIUM

## (undated) DEVICE — DRSG STERISTRIPS 0.5 X 4"

## (undated) DEVICE — SUT MONOSOF 5-0 18" P-13

## (undated) DEVICE — DRSG SPLINT DYL II ARWY SILICONE

## (undated) DEVICE — PAD PRO RADIOLUCENT ECG/DEFIB

## (undated) DEVICE — MEDTRONIC INSTRUMENT TRACKER ENT

## (undated) DEVICE — DRAPE MAYO STAND 30"

## (undated) DEVICE — MEDICATION LABELS W MARKER

## (undated) DEVICE — SUT PDS II 4-0 18" P-3

## (undated) DEVICE — GLV 8.5 PROTEXIS (WHITE)

## (undated) DEVICE — BLADE MEDTRONIC ENT FUSION TRICUT ROTATABLE STRAIGHT 4MM X 13CM

## (undated) DEVICE — SUT CHROMIC 4-0 18" G-2

## (undated) DEVICE — DRAPE 1/2 SHEET 40X57"

## (undated) DEVICE — PACK NASAL

## (undated) DEVICE — DRSG MASTISOL

## (undated) DEVICE — TUBING IRRIGATION STRAIGHT SHOT